# Patient Record
Sex: FEMALE | Race: WHITE | NOT HISPANIC OR LATINO | URBAN - METROPOLITAN AREA
[De-identification: names, ages, dates, MRNs, and addresses within clinical notes are randomized per-mention and may not be internally consistent; named-entity substitution may affect disease eponyms.]

---

## 2015-01-22 LAB
EXTERNAL HIV CONFIRMATION: NORMAL
HCV AB SER-ACNC: NEGATIVE

## 2024-01-17 ENCOUNTER — OFFICE VISIT (OUTPATIENT)
Dept: URGENT CARE | Facility: CLINIC | Age: 34
End: 2024-01-17
Payer: MEDICARE

## 2024-01-17 VITALS
WEIGHT: 218.6 LBS | SYSTOLIC BLOOD PRESSURE: 136 MMHG | OXYGEN SATURATION: 95 % | RESPIRATION RATE: 12 BRPM | HEART RATE: 92 BPM | DIASTOLIC BLOOD PRESSURE: 98 MMHG | TEMPERATURE: 98 F | BODY MASS INDEX: 38.73 KG/M2 | HEIGHT: 63 IN

## 2024-01-17 DIAGNOSIS — J02.0 STREP PHARYNGITIS: Primary | ICD-10-CM

## 2024-01-17 DIAGNOSIS — R68.89 FLU-LIKE SYMPTOMS: ICD-10-CM

## 2024-01-17 LAB
SARS-COV-2 AG UPPER RESP QL IA: NEGATIVE
VALID CONTROL: NORMAL

## 2024-01-17 PROCEDURE — 87811 SARS-COV-2 COVID19 W/OPTIC: CPT

## 2024-01-17 PROCEDURE — 99213 OFFICE O/P EST LOW 20 MIN: CPT

## 2024-01-17 RX ORDER — AMOXICILLIN 500 MG/1
CAPSULE ORAL
COMMUNITY
Start: 2024-01-14

## 2024-01-17 NOTE — PATIENT INSTRUCTIONS
Rapid POC COVID testing negative  Continue antibiotic as previously prescribed  Continue with supportive measures, OTC Tylenol/Ibuprofen, nasal decongestants, and cough suppressants   Cool mist humidifiers, throat lozenges, salt gargles, honey, increased fluid intake and rest   Follow up with PCP in 3-5 days  Present to ER if symptoms worsen     Strep Throat   AMBULATORY CARE:   Strep throat  is a throat infection caused by bacteria. It is easily spread from person to person.  Common symptoms include the following:   Sore, red, and swollen throat    Fever and headache     Upset stomach, abdominal pain, or vomiting    White or yellow patches or blisters in the back of your throat    Tender, swollen lumps on the sides of your neck or jaw    Throat pain when you swallow    Call 911 for any of the following:   You have trouble breathing.      Seek care immediately if:   You have new symptoms like a bad headache, stiff neck, chest pain, or vomiting.    You are drooling because you cannot swallow your spit.    Contact your healthcare provider if:   You have a fever.    You have a rash or ear pain.    You have green, yellow-brown, or bloody mucus when you cough or blow your nose.    You are unable to drink anything.    You have questions or concerns about your condition or care.    Treatment for strep throat  may include antibiotic medicine to treat your strep throat. You should feel better within 2 to 3 days after you start antibiotics. You may return to work or school 24 hours after you start antibiotics.  Manage strep throat:   Use lozenges, ice, soft foods, or popsicles  to soothe your throat.    Drink juice, milk shakes, or soup  if your throat is too sore to eat solid food. Drinking liquids can also help prevent dehydration.    Gargle with salt water.  Mix ¼ teaspoon salt in a glass of warm water and gargle. This may help reduce swelling in your throat.    Do not smoke.  Nicotine and other chemicals in cigarettes and  cigars can cause lung damage and make your symptoms worse. Ask your healthcare provider for information if you currently smoke and need help to quit. E-cigarettes or smokeless tobacco still contain nicotine. Talk to your healthcare provider before you use these products.    Prevent the spread of strep throat:   Wash your hands often.  Use soap and water. Wash your hands after you use the bathroom, change a child's diapers, or sneeze. Wash your hands before you prepare or eat food.     Do not share food or drinks.  Replace your toothbrush after you have taken antibiotics for 24 hours.    Follow up with your doctor as directed:  Write down your questions so you remember to ask them during your visits.  © Copyright [employer] 2023 Information is for End User's use only and may not be sold, redistributed or otherwise used for commercial purposes.  The above information is an  only. It is not intended as medical advice for individual conditions or treatments. Talk to your doctor, nurse or pharmacist before following any medical regimen to see if it is safe and effective for you.

## 2024-01-17 NOTE — PROGRESS NOTES
[facility]        NAME: Radha Humphreys is a 33 y.o. female  : 1990    MRN: [id number]  DATE: 2024  TIME: 12:56 PM    Assessment and Plan   Strep pharyngitis [J02.0]  1. Strep pharyngitis  Poct Covid 19 Rapid Antigen Test      2. Flu-like symptoms          Rapid POC COVID testing negative. Continue Amoxicillin. Encouraged continued supportive measures.  Follow up with PCP in 3-5 days or proceed to emergency department for worsening symptoms.  Patient verbalized understanding of instructions given.       Patient Instructions     Patient Instructions   Rapid POC COVID testing negative  Continue antibiotic as previously prescribed  Continue with supportive measures, OTC Tylenol/Ibuprofen, nasal decongestants, and cough suppressants   Cool mist humidifiers, throat lozenges, salt gargles, honey, increased fluid intake and rest   Follow up with PCP in 3-5 days  Present to ER if symptoms worsen     Strep Throat   AMBULATORY CARE:   Strep throat  is a throat infection caused by bacteria. It is easily spread from person to person.  Common symptoms include the following:   Sore, red, and swollen throat    Fever and headache     Upset stomach, abdominal pain, or vomiting    White or yellow patches or blisters in the back of your throat    Tender, swollen lumps on the sides of your neck or jaw    Throat pain when you swallow    Call 911 for any of the following:   You have trouble breathing.      Seek care immediately if:   You have new symptoms like a bad headache, stiff neck, chest pain, or vomiting.    You are drooling because you cannot swallow your spit.    Contact your healthcare provider if:   You have a fever.    You have a rash or ear pain.    You have green, yellow-brown, or bloody mucus when you cough or blow your nose.    You are unable to drink anything.    You have questions or concerns about your condition or care.    Treatment for strep throat  may include antibiotic medicine to  treat your strep throat. You should feel better within 2 to 3 days after you start antibiotics. You may return to work or school 24 hours after you start antibiotics.  Manage strep throat:   Use lozenges, ice, soft foods, or popsicles  to soothe your throat.    Drink juice, milk shakes, or soup  if your throat is too sore to eat solid food. Drinking liquids can also help prevent dehydration.    Gargle with salt water.  Mix ¼ teaspoon salt in a glass of warm water and gargle. This may help reduce swelling in your throat.    Do not smoke.  Nicotine and other chemicals in cigarettes and cigars can cause lung damage and make your symptoms worse. Ask your healthcare provider for information if you currently smoke and need help to quit. E-cigarettes or smokeless tobacco still contain nicotine. Talk to your healthcare provider before you use these products.    Prevent the spread of strep throat:   Wash your hands often.  Use soap and water. Wash your hands after you use the bathroom, change a child's diapers, or sneeze. Wash your hands before you prepare or eat food.     Do not share food or drinks.  Replace your toothbrush after you have taken antibiotics for 24 hours.    Follow up with your doctor as directed:  Write down your questions so you remember to ask them during your visits.  © Copyright [employer] 2023 Information is for End User's use only and may not be sold, redistributed or otherwise used for commercial purposes.  The above information is an  only. It is not intended as medical advice for individual conditions or treatments. Talk to your doctor, nurse or pharmacist before following any medical regimen to see if it is safe and effective for you.        Chief Complaint     Chief Complaint   Patient presents with    Cold Like Symptoms     Fever (103), sinus congestion, body aches, poor appetite, and loss of taste starting Friday. Sunday tx positive for strep throat on amoxicillin. Monday symptoms  got worse. Diarrhea starting today. At home COVID test yesterday was negative. OTC meds - tylenol.         History of Present Illness       33-year-old female with no significant past medical history presents with complaints of cold-like symptoms.  Patient reports initially developed chills, body aches, and sore throat on Friday.  Seen at an outside urgent care on Sunday and diagnosed with strep pharyngitis.  No other testing performed.  Started on amoxicillin however on Monday he reported nasal congestion, cough, continued sore throat and bodyaches as well as diarrhea.  Now reporting loss of taste. Tmax 102.  Would like COVID testing.  She has been taking amoxicillin as prescribed and reports relief of sore throat. Negative at home COVID testing x2.         Review of Systems   Review of Systems   Constitutional:  Positive for chills and fever.   HENT:  Positive for congestion, rhinorrhea and sore throat. Negative for ear discharge, ear pain, trouble swallowing and voice change.    Eyes:  Negative for discharge.   Respiratory:  Positive for cough. Negative for shortness of breath and wheezing.    Cardiovascular:  Negative for chest pain.   Gastrointestinal:  Positive for diarrhea. Negative for abdominal pain, nausea and vomiting.   Musculoskeletal:  Positive for myalgias.         Current Medications       Current Outpatient Medications:     Acetaminophen (TYLENOL PO), Take by mouth, Disp: , Rfl:     amoxicillin (AMOXIL) 500 mg capsule, TAKE 1 CAPSULE BY MOUTH TWICE DAILY FOR 10 DAYS, Disp: , Rfl:     Current Allergies     Allergies as of 01/17/2024    (No Known Allergies)            The following portions of the patient's history were reviewed and updated as appropriate: allergies, current medications, past family history, past medical history, past social history, past surgical history and problem list.     History reviewed. No pertinent past medical history.    Past Surgical History:   Procedure Laterality Date     " SECTION      x2    OTHER SURGICAL HISTORY      ovarian tube removal       History reviewed. No pertinent family history.      Medications have been verified.        Objective   /98   Pulse 92   Temp 98 °F (36.7 °C)   Resp 12   Ht 5' 2.5\" (1.588 m)   Wt 99.2 kg (218 lb 9.6 oz)   LMP 2024   SpO2 95%   BMI 39.35 kg/m²   Patient's last menstrual period was 2024.       Physical Exam     Physical Exam  Vitals and nursing note reviewed.   Constitutional:       General: She is not in acute distress.     Appearance: She is not toxic-appearing.   HENT:      Head: Normocephalic.      Right Ear: Ear canal and external ear normal. There is impacted cerumen.      Left Ear: Ear canal and external ear normal. There is impacted cerumen.      Nose: Congestion present.      Mouth/Throat:      Mouth: Mucous membranes are moist.      Pharynx: Posterior oropharyngeal erythema present.      Tonsils: No tonsillar exudate. 1+ on the right. 1+ on the left.   Eyes:      Conjunctiva/sclera: Conjunctivae normal.   Cardiovascular:      Rate and Rhythm: Normal rate and regular rhythm.      Heart sounds: Normal heart sounds.   Pulmonary:      Effort: Pulmonary effort is normal. No respiratory distress.      Breath sounds: Normal breath sounds. No stridor. No wheezing, rhonchi or rales.   Lymphadenopathy:      Cervical: No cervical adenopathy.   Skin:     General: Skin is warm and dry.   Neurological:      Mental Status: She is alert and oriented to person, place, and time.      Gait: Gait is intact.   Psychiatric:         Mood and Affect: Mood normal.         Behavior: Behavior normal.                   "

## 2025-02-05 ENCOUNTER — OFFICE VISIT (OUTPATIENT)
Dept: URGENT CARE | Facility: CLINIC | Age: 35
End: 2025-02-05
Payer: MEDICARE

## 2025-02-05 VITALS
WEIGHT: 228.4 LBS | TEMPERATURE: 100.4 F | HEIGHT: 62 IN | SYSTOLIC BLOOD PRESSURE: 162 MMHG | HEART RATE: 91 BPM | RESPIRATION RATE: 18 BRPM | BODY MASS INDEX: 42.03 KG/M2 | OXYGEN SATURATION: 97 % | DIASTOLIC BLOOD PRESSURE: 92 MMHG

## 2025-02-05 DIAGNOSIS — R68.89 FLU-LIKE SYMPTOMS: Primary | ICD-10-CM

## 2025-02-05 DIAGNOSIS — R03.0 ELEVATED BLOOD PRESSURE READING: ICD-10-CM

## 2025-02-05 PROCEDURE — 99213 OFFICE O/P EST LOW 20 MIN: CPT | Performed by: PHYSICIAN ASSISTANT

## 2025-02-05 RX ORDER — BENZONATATE 200 MG/1
200 CAPSULE ORAL 3 TIMES DAILY PRN
Qty: 20 CAPSULE | Refills: 0 | Status: SHIPPED | OUTPATIENT
Start: 2025-02-05 | End: 2025-02-19

## 2025-02-05 NOTE — PROGRESS NOTES
[facility]        NAME: Radha Humphreys is a 34 y.o. female  : 1990    MRN: [id number]  DATE: 2025  TIME: 10:32 AM    Assessment and Plan   Flu-like symptoms [R68.89]  1. Flu-like symptoms  benzonatate (TESSALON) 200 MG capsule      2. Elevated blood pressure reading          Patient advised to follow-up with PCP regarding blood pressure.  She is to avoid decongestants.    Patient Instructions   Over-the-counter cold and flu medications as needed for symptoms.  Drink plenty of fluids.  Follow-up with the PCP in 3 to 5 days if her symptoms do not improve.  Go to ER if symptoms become severe.    Over-the-counter cold medication 101:  - Guaifenesin (ex. Mucinex) is a mucus thinner.  Good for sinus or chest congestion.  Works best if you drink plenty of fluids.  -Dextromethorphan (ex. Delsym, Mucinex DM) as a cough suppressant  -Pseudoephedrine (ex. Sudaphed) is a decongestion and should not be used by those with high blood pressure or heart problems.  -Flonase is a intranasal steroid.  Good for sinus congestion and postnasal drip.  -Antihistamines (Claritin, Zyrtec, Allegra Benadryl) are used for allergies as well as colds for treatment of congestion and ear fullness.      Follow up with PCP in 3-5 days.  Proceed to  ER if symptoms worsen.    If tests have been performed at [facility], our office will contact you with results if changes need to be made to the care plan discussed with you at the visit.  [facility].    Chief Complaint     Chief Complaint   Patient presents with    Cold Like Symptoms     Fever, cough, and body aches starting yesterday.          History of Present Illness       Patient a 34-year-old female with no significant past medical history presents the office complaining of fever, body aches, and cough since yesterday.    URI   This is a new problem. The problem has been gradually worsening. Associated symptoms include coughing,  "headaches and wheezing. Pertinent negatives include no abdominal pain, chest pain, congestion, diarrhea, nausea, rash, sore throat or vomiting.       Review of Systems   Review of Systems   Constitutional:  Positive for fever.   HENT:  Negative for congestion and sore throat.    Eyes:  Negative for visual disturbance.   Respiratory:  Positive for cough, chest tightness and wheezing. Negative for shortness of breath.    Cardiovascular:  Negative for chest pain and palpitations.   Gastrointestinal:  Negative for abdominal pain, diarrhea, nausea and vomiting.   Musculoskeletal:  Positive for myalgias.   Skin:  Negative for rash.   Neurological:  Positive for headaches. Negative for dizziness and light-headedness.         Current Medications       Current Outpatient Medications:     Acetaminophen (TYLENOL PO), Take by mouth, Disp: , Rfl:     benzonatate (TESSALON) 200 MG capsule, Take 1 capsule (200 mg total) by mouth 3 (three) times a day as needed for cough for up to 14 days, Disp: 20 capsule, Rfl: 0    amoxicillin (AMOXIL) 500 mg capsule, TAKE 1 CAPSULE BY MOUTH TWICE DAILY FOR 10 DAYS (Patient not taking: Reported on 2025), Disp: , Rfl:     Current Allergies     Allergies as of 2025    (No Known Allergies)            The following portions of the patient's history were reviewed and updated as appropriate: allergies, current medications, past family history, past medical history, past social history, past surgical history and problem list.     History reviewed. No pertinent past medical history.    Past Surgical History:   Procedure Laterality Date     SECTION      x2    OTHER SURGICAL HISTORY      ovarian tube removal       History reviewed. No pertinent family history.      Medications have been verified.        Objective   /92   Pulse 91   Temp 100.4 °F (38 °C)   Resp 18   Ht 5' 2\" (1.575 m)   Wt 104 kg (228 lb 6.4 oz)   LMP 01/15/2025   SpO2 97%   BMI 41.77 kg/m²   Patient's last " menstrual period was 01/15/2025.       Physical Exam     Physical Exam  Vitals and nursing note reviewed.   Constitutional:       Appearance: Normal appearance. She is well-developed.   HENT:      Head: Normocephalic and atraumatic.      Right Ear: Tympanic membrane, ear canal and external ear normal.      Left Ear: Tympanic membrane, ear canal and external ear normal.      Nose: Nose normal.      Mouth/Throat:      Pharynx: Uvula midline.   Eyes:      General: Lids are normal.      Conjunctiva/sclera: Conjunctivae normal.      Pupils: Pupils are equal, round, and reactive to light.   Cardiovascular:      Rate and Rhythm: Normal rate and regular rhythm.      Pulses: Normal pulses.      Heart sounds: Normal heart sounds. No murmur heard.     No friction rub. No gallop.   Pulmonary:      Effort: Pulmonary effort is normal.      Breath sounds: Normal breath sounds. No wheezing, rhonchi or rales.   Musculoskeletal:         General: Normal range of motion.      Cervical back: Neck supple.   Lymphadenopathy:      Cervical: No cervical adenopathy.   Skin:     General: Skin is warm and dry.      Capillary Refill: Capillary refill takes less than 2 seconds.   Neurological:      Mental Status: She is alert.

## 2025-02-05 NOTE — LETTER
February 5, 2025     Patient: Radha Humphreys   YOB: 1990   Date of Visit: 2/5/2025       To Whom It May Concern:    It is my medical opinion that Radha Hupmhreys should remain out of work until fever free for 24 hours .             Sincerely,        Kailee Roy PA-C

## 2025-06-17 ENCOUNTER — OFFICE VISIT (OUTPATIENT)
Age: 35
End: 2025-06-17
Payer: MEDICARE

## 2025-06-17 VITALS
WEIGHT: 225.6 LBS | SYSTOLIC BLOOD PRESSURE: 130 MMHG | HEART RATE: 74 BPM | HEIGHT: 62 IN | BODY MASS INDEX: 41.51 KG/M2 | DIASTOLIC BLOOD PRESSURE: 86 MMHG | OXYGEN SATURATION: 98 % | RESPIRATION RATE: 18 BRPM

## 2025-06-17 DIAGNOSIS — I10 PRIMARY HYPERTENSION: ICD-10-CM

## 2025-06-17 DIAGNOSIS — Z00.00 ANNUAL PHYSICAL EXAM: ICD-10-CM

## 2025-06-17 DIAGNOSIS — M54.12 CERVICAL RADICULOPATHY: Primary | ICD-10-CM

## 2025-06-17 PROCEDURE — 99385 PREV VISIT NEW AGE 18-39: CPT | Performed by: FAMILY MEDICINE

## 2025-06-17 PROCEDURE — 99213 OFFICE O/P EST LOW 20 MIN: CPT | Performed by: FAMILY MEDICINE

## 2025-06-17 RX ORDER — AZITHROMYCIN 250 MG/1
250 TABLET, FILM COATED ORAL EVERY 24 HOURS
COMMUNITY
Start: 2025-05-30 | End: 2025-06-24 | Stop reason: ALTCHOICE

## 2025-06-17 RX ORDER — CHLORHEXIDINE GLUCONATE ORAL RINSE 1.2 MG/ML
10 SOLUTION DENTAL 2 TIMES DAILY
COMMUNITY
Start: 2025-05-02

## 2025-06-17 RX ORDER — LISINOPRIL 10 MG/1
10 TABLET ORAL DAILY
Qty: 30 TABLET | Refills: 1 | Status: SHIPPED | OUTPATIENT
Start: 2025-06-17

## 2025-06-17 NOTE — PROGRESS NOTES
Adult Annual Physical  Name: Radha Humphreys      : 1990      MRN: [id number]  Encounter Provider: BJ Xiao  Encounter Date: 2025   Encounter department: [facility]    :  Assessment & Plan  Cervical radiculopathy  Patient sees pain management through [facility] and gets steroid injections.       Annual physical exam               Preventive Screenings:  - Diabetes Screening: orders placed  - Cholesterol Screening: orders placed   - Lung cancer screening: screening not indicated          History of Present Illness     Adult Annual Physical:  Patient presents for annual physical. Patient here to establish care and blood pressure check. Patient was recently at the dentist and was told her BP was high so she should see a PCP. Patient admits that her BP is always high when they check it automatic but manually is fine. She denies headache, dizziness, blurry vision. Denies family or personal hx of hypertension. Patient reports chronic neck pain for which she gets steroid injection periodically. Per patient she is up to date with her pAP smear, she sees Dr. Romano.     Diet and Physical Activity:  - Diet/Nutrition: no special diet.  - Exercise: walking.    General Health:  - Sleep: sleeps well.  - Hearing: normal hearing bilateral ears.  - Vision: no vision problems.  - Dental: regular dental visits and brushes teeth twice daily.    /GYN Health:  - Follows with GYN: yes.   - Last menstrual cycle: 2025.   - Contraception: tubal ligation.      Review of Systems   Constitutional:  Negative for chills and fever.   HENT:  Negative for ear pain and sore throat.    Eyes:  Negative for pain and visual disturbance.   Respiratory:  Negative for cough and shortness of breath.    Cardiovascular:  Negative for chest pain and palpitations.   Gastrointestinal:  Negative for abdominal pain and vomiting.   Genitourinary:  Negative for dysuria and hematuria.   Musculoskeletal:  Positive  "for neck pain. Negative for arthralgias and back pain.   Skin:  Negative for color change and rash.   Neurological:  Negative for seizures and syncope.   All other systems reviewed and are negative.        Objective   /86 (BP Location: Right arm, Patient Position: Sitting, Cuff Size: Standard)   Pulse 74   Resp 18   Ht 5' 2\" (1.575 m)   Wt 102 kg (225 lb 9.6 oz)   LMP 05/19/2025   SpO2 98%   BMI 41.26 kg/m²     Physical Exam    "

## 2025-06-17 NOTE — ASSESSMENT & PLAN NOTE
Patient started on Lisinopril. Educated on use and side effect. DASH diet (low saturated fat, cholesterol, and total fat; increase fruits and vegetables; fat free or low fat milk or milk products; and increased fiber). Aerobic exercise and limitation of sodium. Weight loss.

## 2025-06-17 NOTE — PATIENT INSTRUCTIONS
"Patient Education     Routine physical for adults   The Basics   Written by the doctors and editors at [facility]   What is a physical? -- A physical is a routine visit, or \"check-up,\" with your doctor. You might also hear it called a \"wellness visit\" or \"preventive visit.\"  During each visit, the doctor will:   Ask about your physical and mental health   Ask about your habits, behaviors, and lifestyle   Do an exam   Give you vaccines if needed   Talk to you about any medicines you take   Give advice about your health   Answer your questions  Getting regular check-ups is an important part of taking care of your health. It can help your doctor find and treat any problems you have. But it's also important for preventing health problems.  A routine physical is different from a \"sick visit.\" A sick visit is when you see a doctor because of a health concern or problem. Since physicals are scheduled ahead of time, you can think about what you want to ask the doctor.  How often should I get a physical? -- It depends on your age and health. In general, for people age 21 years and older:   If you are younger than 50 years, you might be able to get a physical every 3 years.   If you are 50 years or older, your doctor might recommend a physical every year.  If you have an ongoing health condition, like diabetes or high blood pressure, your doctor will probably want to see you more often.  What happens during a physical? -- In general, each visit will include:   Physical exam - The doctor or nurse will check your height, weight, heart rate, and blood pressure. They will also look at your eyes and ears. They will ask about how you are feeling and whether you have any symptoms that bother you.   Medicines - It's a good idea to bring a list of all the medicines you take to each doctor visit. Your doctor will talk to you about your medicines and answer any questions. Tell them if you are having any side effects that bother you. You " "should also tell them if you are having trouble paying for any of your medicines.   Habits and behaviors - This includes:   Your diet   Your exercise habits   Whether you smoke, drink alcohol, or use drugs   Whether you are sexually active   Whether you feel safe at home  Your doctor will talk to you about things you can do to improve your health and lower your risk of health problems. They will also offer help and support. For example, if you want to quit smoking, they can give you advice and might prescribe medicines. If you want to improve your diet or get more physical activity, they can help you with this, too.   Lab tests, if needed - The tests you get will depend on your age and situation. For example, your doctor might want to check your:   Cholesterol   Blood sugar   Iron level   Vaccines - The recommended vaccines will depend on your age, health, and what vaccines you already had. Vaccines are very important because they can prevent certain serious or deadly infections.   Discussion of screening - \"Screening\" means checking for diseases or other health problems before they cause symptoms. Your doctor can recommend screening based on your age, risk, and preferences. This might include tests to check for:   Cancer, such as breast, prostate, cervical, ovarian, colorectal, prostate, lung, or skin cancer   Sexually transmitted infections, such as chlamydia and gonorrhea   Mental health conditions like depression and anxiety  Your doctor will talk to you about the different types of screening tests. They can help you decide which screenings to have. They can also explain what the results might mean.   Answering questions - The physical is a good time to ask the doctor or nurse questions about your health. If needed, they can refer you to other doctors or specialists, too.  Adults older than 65 years often need other care, too. As you get older, your doctor will talk to you about:   How to prevent falling at " home   Hearing or vision tests   Memory testing   How to take your medicines safely   Making sure that you have the help and support you need at home  All topics are updated as new evidence becomes available and our peer review process is complete.  This topic retrieved from [employer] on: May 02, 2024.  Topic 396530 Version 1.0  Release: 32.4.3 - C32.122  © 2024 UpToDate, Inc. and/or its affiliates. All rights reserved.  Consumer Information Use and Disclaimer   Disclaimer: This generalized information is a limited summary of diagnosis, treatment, and/or medication information. It is not meant to be comprehensive and should be used as a tool to help the user understand and/or assess potential diagnostic and treatment options. It does NOT include all information about conditions, treatments, medications, side effects, or risks that may apply to a specific patient. It is not intended to be medical advice or a substitute for the medical advice, diagnosis, or treatment of a health care provider based on the health care provider's examination and assessment of a patient's specific and unique circumstances. Patients must speak with a health care provider for complete information about their health, medical questions, and treatment options, including any risks or benefits regarding use of medications. This information does not endorse any treatments or medications as safe, effective, or approved for treating a specific patient. UpToDate, Inc. and its affiliates disclaim any warranty or liability relating to this information or the use thereof.The use of this information is governed by the Terms of Use, available at https://www.[employer]ters[employer] Internationaluwer.com/en/know/clinical-effectiveness-terms. 2024© UpToDate, Inc. and its affiliates and/or licensors. All rights reserved.  Copyright   © 2024 UpToDate, Inc. and/or its affiliates. All rights reserved.

## 2025-06-20 ENCOUNTER — APPOINTMENT (OUTPATIENT)
Age: 35
End: 2025-06-20
Payer: MEDICARE

## 2025-06-20 DIAGNOSIS — Z00.00 ANNUAL PHYSICAL EXAM: ICD-10-CM

## 2025-06-21 ENCOUNTER — APPOINTMENT (OUTPATIENT)
Age: 35
End: 2025-06-21
Payer: MEDICARE

## 2025-06-21 LAB
ALBUMIN SERPL BCG-MCNC: 4.2 G/DL (ref 3.5–5)
ALP SERPL-CCNC: 69 U/L (ref 34–104)
ALT SERPL W P-5'-P-CCNC: 17 U/L (ref 7–52)
ANION GAP SERPL CALCULATED.3IONS-SCNC: 7 MMOL/L (ref 4–13)
AST SERPL W P-5'-P-CCNC: 14 U/L (ref 13–39)
BASOPHILS # BLD AUTO: 0.08 THOUSANDS/ÂΜL (ref 0–0.1)
BASOPHILS NFR BLD AUTO: 1 % (ref 0–1)
BILIRUB SERPL-MCNC: 0.39 MG/DL (ref 0.2–1)
BUN SERPL-MCNC: 8 MG/DL (ref 5–25)
CALCIUM SERPL-MCNC: 9.2 MG/DL (ref 8.4–10.2)
CHLORIDE SERPL-SCNC: 106 MMOL/L (ref 96–108)
CHOLEST SERPL-MCNC: 167 MG/DL (ref ?–200)
CO2 SERPL-SCNC: 26 MMOL/L (ref 21–32)
CREAT SERPL-MCNC: 0.58 MG/DL (ref 0.6–1.3)
EOSINOPHIL # BLD AUTO: 0.5 THOUSAND/ÂΜL (ref 0–0.61)
EOSINOPHIL NFR BLD AUTO: 7 % (ref 0–6)
ERYTHROCYTE [DISTWIDTH] IN BLOOD BY AUTOMATED COUNT: 13.9 % (ref 11.6–15.1)
EST. AVERAGE GLUCOSE BLD GHB EST-MCNC: 123 MG/DL
GFR SERPL CREATININE-BSD FRML MDRD: 120 ML/MIN/1.73SQ M
GLUCOSE P FAST SERPL-MCNC: 89 MG/DL (ref 65–99)
HBA1C MFR BLD: 5.9 %
HCT VFR BLD AUTO: 44.5 % (ref 34.8–46.1)
HDLC SERPL-MCNC: 47 MG/DL
HGB BLD-MCNC: 13.6 G/DL (ref 11.5–15.4)
IMM GRANULOCYTES # BLD AUTO: 0.02 THOUSAND/UL (ref 0–0.2)
IMM GRANULOCYTES NFR BLD AUTO: 0 % (ref 0–2)
LDLC SERPL CALC-MCNC: 102 MG/DL (ref 0–100)
LYMPHOCYTES # BLD AUTO: 2.68 THOUSANDS/ÂΜL (ref 0.6–4.47)
LYMPHOCYTES NFR BLD AUTO: 39 % (ref 14–44)
MCH RBC QN AUTO: 26.3 PG (ref 26.8–34.3)
MCHC RBC AUTO-ENTMCNC: 30.6 G/DL (ref 31.4–37.4)
MCV RBC AUTO: 86 FL (ref 82–98)
MONOCYTES # BLD AUTO: 0.48 THOUSAND/ÂΜL (ref 0.17–1.22)
MONOCYTES NFR BLD AUTO: 7 % (ref 4–12)
NEUTROPHILS # BLD AUTO: 3.12 THOUSANDS/ÂΜL (ref 1.85–7.62)
NEUTS SEG NFR BLD AUTO: 46 % (ref 43–75)
NONHDLC SERPL-MCNC: 120 MG/DL
NRBC BLD AUTO-RTO: 0 /100 WBCS
PLATELET # BLD AUTO: 336 THOUSANDS/UL (ref 149–390)
PMV BLD AUTO: 10.4 FL (ref 8.9–12.7)
POTASSIUM SERPL-SCNC: 4.4 MMOL/L (ref 3.5–5.3)
PROT SERPL-MCNC: 7.1 G/DL (ref 6.4–8.4)
RBC # BLD AUTO: 5.18 MILLION/UL (ref 3.81–5.12)
SODIUM SERPL-SCNC: 139 MMOL/L (ref 135–147)
TRIGL SERPL-MCNC: 89 MG/DL (ref ?–150)
TSH SERPL DL<=0.05 MIU/L-ACNC: 1.53 UIU/ML (ref 0.45–4.5)
WBC # BLD AUTO: 6.88 THOUSAND/UL (ref 4.31–10.16)

## 2025-06-21 PROCEDURE — 80053 COMPREHEN METABOLIC PANEL: CPT

## 2025-06-21 PROCEDURE — 36415 COLL VENOUS BLD VENIPUNCTURE: CPT

## 2025-06-21 PROCEDURE — 83036 HEMOGLOBIN GLYCOSYLATED A1C: CPT

## 2025-06-21 PROCEDURE — 80061 LIPID PANEL: CPT

## 2025-06-21 PROCEDURE — 85025 COMPLETE CBC W/AUTO DIFF WBC: CPT

## 2025-06-21 PROCEDURE — 84443 ASSAY THYROID STIM HORMONE: CPT

## 2025-06-23 ENCOUNTER — TELEPHONE (OUTPATIENT)
Dept: ADMINISTRATIVE | Facility: OTHER | Age: 35
End: 2025-06-23

## 2025-06-23 NOTE — TELEPHONE ENCOUNTER
Upon review of the In Basket request we were able to locate, review, and update the patient chart as requested for Hepatitis C , HIV, and Pap Smear (HPV) aka Cervical Cancer Screening.    Any additional questions or concerns should be emailed to the Practice Liaisons via the appropriate education email address, please do not reply via In Basket.    Thank you  Frieda Oh   [facility]

## 2025-06-23 NOTE — TELEPHONE ENCOUNTER
----- Message from Mima ORDOÑEZ sent at 6/20/2025  1:29 PM EDT -----  Regarding: Care Gap Request  06/20/25 1:29 PM    Hello, our patient Radha Humphreys has had Pap Smear (HPV) aka Cervical Cancer Screening completed/performed. Please assist in updating the patient chart by pulling the document from Labs Tab within Chart Review. The date of service is 04/09/2021.     Thank you,  Mima Clemente [facility]     06/20/25 1:29 PM    Hello, our patient Radha Humphreys has had Hepatitis C and HIV completed/performed. Please assist in updating the patient chart by pulling the Care Everywhere (CE) document. The date of service is 01/22/2015.     Thank you,  Mima Clemente [facility]

## 2025-06-24 ENCOUNTER — OFFICE VISIT (OUTPATIENT)
Age: 35
End: 2025-06-24
Payer: MEDICARE

## 2025-06-24 VITALS
DIASTOLIC BLOOD PRESSURE: 80 MMHG | OXYGEN SATURATION: 98 % | BODY MASS INDEX: 41.44 KG/M2 | WEIGHT: 225.2 LBS | HEART RATE: 84 BPM | RESPIRATION RATE: 18 BRPM | SYSTOLIC BLOOD PRESSURE: 110 MMHG | HEIGHT: 62 IN

## 2025-06-24 DIAGNOSIS — I10 PRIMARY HYPERTENSION: Primary | ICD-10-CM

## 2025-06-24 DIAGNOSIS — M54.12 CERVICAL RADICULOPATHY: ICD-10-CM

## 2025-06-24 PROCEDURE — 99213 OFFICE O/P EST LOW 20 MIN: CPT | Performed by: FAMILY MEDICINE

## 2025-06-24 NOTE — PROGRESS NOTES
Name: Radha Humphreys      : 1990      MRN: [id number]  Encounter Provider: BJ Xiao  Encounter Date: 2025   Encounter department: [facility]  :  Assessment & Plan  Primary hypertension  Patient started on Lisinopril. Educated on use and side effect. DASH diet (low saturated fat, cholesterol, and total fat; increase fruits and vegetables; fat free or low fat milk or milk products; and increased fiber). Aerobic exercise and limitation of sodium. Weight loss.          Cervical radiculopathy  Patient sees pain management through [facility] and gets steroid injections.                History of Present Illness     Radha Humphreys is here for chronic conditions f/u. Pt. had labs done prior to today's visit which included Recent Results (from the past 4 weeks)  -Comprehensive metabolic panel:   Collection Time: 25  9:43 AM       Result                      Value             Ref Range           Sodium                      139               135 - 147 mm*       Potassium                   4.4               3.5 - 5.3 mm*       Chloride                    106               96 - 108 mmo*       CO2                         26                21 - 32 mmol*       ANION GAP                   7                 4 - 13 mmol/L       BUN                         8                 5 - 25 mg/dL        Creatinine                  0.58 (L)          0.60 - 1.30 *       Glucose, Fasting            89                65 - 99 mg/dL       Calcium                     9.2               8.4 - 10.2 m*       AST                         14                13 - 39 U/L         ALT                         17                7 - 52 U/L          Alkaline Phosphatase        69                34 - 104 U/L        Total Protein               7.1               6.4 - 8.4 g/*       Albumin                     4.2               3.5 - 5.0 g/*       Total Bilirubin             0.39              0.20 - 1.00 *       eGFR                         120               ml/min/1.73s*  -CBC and differential:   Collection Time: 06/21/25  9:43 AM       Result                      Value             Ref Range           WBC                         6.88              4.31 - 10.16*       RBC                         5.18 (H)          3.81 - 5.12 *       Hemoglobin                  13.6              11.5 - 15.4 *       Hematocrit                  44.5              34.8 - 46.1 %       MCV                         86                82 - 98 fL          MCH                         26.3 (L)          26.8 - 34.3 *       MCHC                        30.6 (L)          31.4 - 37.4 *       RDW                         13.9              11.6 - 15.1 %       MPV                         10.4              8.9 - 12.7 fL       Platelets                   336               149 - 390 Th*       nRBC                        0                 /100 WBCs           Segmented %                 46                43 - 75 %           Immature Grans %            0                 0 - 2 %             Lymphocytes %               39                14 - 44 %           Monocytes %                 7                 4 - 12 %            Eosinophils Relative        7 (H)             0 - 6 %             Basophils Relative          1                 0 - 1 %             Absolute Neutrophils        3.12              1.85 - 7.62 *       Absolute Immature Grans     0.02              0.00 - 0.20 *       Absolute Lymphocytes        2.68              0.60 - 4.47 *       Absolute Monocytes          0.48              0.17 - 1.22 *       Eosinophils Absolute        0.50              0.00 - 0.61 *       Basophils Absolute          0.08              0.00 - 0.10 *  -TSH, 3rd generation:   Collection Time: 06/21/25  9:43 AM       Result                      Value             Ref Range           TSH 3RD GENERATION          1.526             0.450 - 4.50*  -Lipid panel:   Collection Time: 06/21/25  9:43 AM        "Result                      Value             Ref Range           Cholesterol                 167               See Comment *       Triglycerides               89                See Comment *       HDL, Direct                 47 (L)            >=50 mg/dL          LDL Calculated              102 (H)           0 - 100 mg/dL       Non-HDL-Chol (CHOL-HDL)     120               mg/dl          -Hemoglobin A1C:   Collection Time: 06/21/25  9:43 AM       Result                      Value             Ref Range           Hemoglobin A1C              5.9 (H)           Normal 4.0-5*       EAG                         123               mg/dl                Review of Systems   Constitutional:  Negative for chills and fever.   HENT:  Negative for ear pain and sore throat.    Eyes:  Negative for pain and visual disturbance.   Respiratory:  Negative for cough and shortness of breath.    Cardiovascular:  Negative for chest pain and palpitations.   Gastrointestinal:  Negative for abdominal pain and vomiting.   Genitourinary:  Negative for dysuria and hematuria.   Musculoskeletal:  Negative for arthralgias and back pain.   Skin:  Negative for color change and rash.   Neurological:  Negative for seizures and syncope.   All other systems reviewed and are negative.      Objective   /80 (BP Location: Right arm, Patient Position: Sitting, Cuff Size: Standard)   Pulse 84   Resp 18   Ht 5' 2\" (1.575 m)   Wt 102 kg (225 lb 3.2 oz)   LMP 06/19/2025   SpO2 98%   BMI 41.19 kg/m²      Physical Exam  Vitals and nursing note reviewed.   Constitutional:       General: She is not in acute distress.     Appearance: She is well-developed. She is obese.   HENT:      Head: Normocephalic and atraumatic.     Eyes:      Conjunctiva/sclera: Conjunctivae normal.       Cardiovascular:      Rate and Rhythm: Normal rate and regular rhythm.      Heart sounds: No murmur heard.  Pulmonary:      Effort: Pulmonary effort is normal. No respiratory distress. "      Breath sounds: Normal breath sounds.   Abdominal:      Palpations: Abdomen is soft.      Tenderness: There is no abdominal tenderness.     Musculoskeletal:         General: No swelling.      Cervical back: Neck supple.     Skin:     General: Skin is warm and dry.      Capillary Refill: Capillary refill takes less than 2 seconds.     Neurological:      Mental Status: She is alert.     Psychiatric:         Mood and Affect: Mood normal.